# Patient Record
Sex: FEMALE | Race: WHITE
[De-identification: names, ages, dates, MRNs, and addresses within clinical notes are randomized per-mention and may not be internally consistent; named-entity substitution may affect disease eponyms.]

---

## 2017-08-11 ENCOUNTER — RESULT REVIEW (OUTPATIENT)
Age: 22
End: 2017-08-11

## 2017-10-24 ENCOUNTER — RESULT REVIEW (OUTPATIENT)
Age: 22
End: 2017-10-24

## 2018-11-06 ENCOUNTER — RESULT REVIEW (OUTPATIENT)
Age: 23
End: 2018-11-06

## 2018-12-21 ENCOUNTER — APPOINTMENT (OUTPATIENT)
Dept: NEUROLOGY | Facility: CLINIC | Age: 23
End: 2018-12-21
Payer: OTHER GOVERNMENT

## 2018-12-21 VITALS
DIASTOLIC BLOOD PRESSURE: 76 MMHG | SYSTOLIC BLOOD PRESSURE: 119 MMHG | BODY MASS INDEX: 28.25 KG/M2 | WEIGHT: 180 LBS | HEART RATE: 77 BPM | HEIGHT: 67 IN

## 2018-12-21 DIAGNOSIS — G40.909 EPILEPSY, UNSPECIFIED, NOT INTRACTABLE, W/OUT STATUS EPILEPTICUS: ICD-10-CM

## 2018-12-21 PROCEDURE — 99205 OFFICE O/P NEW HI 60 MIN: CPT

## 2018-12-21 RX ORDER — SUMATRIPTAN 50 MG/1
50 TABLET, FILM COATED ORAL
Qty: 6 | Refills: 1 | Status: ACTIVE | COMMUNITY
Start: 2018-12-21 | End: 1900-01-01

## 2018-12-27 ENCOUNTER — OTHER (OUTPATIENT)
Age: 23
End: 2018-12-27

## 2019-01-25 ENCOUNTER — APPOINTMENT (OUTPATIENT)
Dept: NEUROLOGY | Facility: CLINIC | Age: 24
End: 2019-01-25

## 2019-01-28 ENCOUNTER — MEDICATION RENEWAL (OUTPATIENT)
Age: 24
End: 2019-01-28

## 2019-02-01 ENCOUNTER — APPOINTMENT (OUTPATIENT)
Dept: NEUROLOGY | Facility: CLINIC | Age: 24
End: 2019-02-01
Payer: OTHER GOVERNMENT

## 2019-02-01 VITALS
HEIGHT: 67 IN | WEIGHT: 175 LBS | SYSTOLIC BLOOD PRESSURE: 139 MMHG | HEART RATE: 79 BPM | BODY MASS INDEX: 27.47 KG/M2 | DIASTOLIC BLOOD PRESSURE: 89 MMHG

## 2019-02-01 DIAGNOSIS — G43.109 MIGRAINE WITH AURA, NOT INTRACTABLE, W/OUT STATUS MIGRAINOSUS: ICD-10-CM

## 2019-02-01 DIAGNOSIS — R51 HEADACHE: ICD-10-CM

## 2019-02-01 PROCEDURE — 99214 OFFICE O/P EST MOD 30 MIN: CPT

## 2019-02-01 RX ORDER — ETHINYL ESTRADIOL AND LEVONORGESTREL 150-30(84)
0.15-0.03 &0.01 KIT ORAL
Refills: 0 | Status: ACTIVE | COMMUNITY

## 2019-02-01 RX ORDER — TOPIRAMATE 25 MG/1
25 TABLET, FILM COATED ORAL TWICE DAILY
Qty: 120 | Refills: 5 | Status: DISCONTINUED | COMMUNITY
Start: 2019-01-28 | End: 2019-02-01

## 2019-02-01 NOTE — ASSESSMENT
[FreeTextEntry1] : Ms. BELL presents with c/o frequent headache with migrainous quality. She also has focal complaint of left vision changes intermittently unrelated to severe HA, but associated with milder symptoms of left retroorbital pressure which may also be migrainous in quality. Change in headache symptoms and presence of persistent focality in migraine aura should be investigated with MRI.  Ms. BELL has h/o seizure, though unlikely, should also be investigated. In the interval, episodic HA have improved on topiramate but she is still experiencing moderate chronic daily headache. \par \par I suggested the following:\par \par 1. increase topiramate 75 q12. Ms. BELL was counselled on side-effects including weight loss, paresthesia, and lower efficacy of OCD and need to use barrier method.  and counselled on risk of nephrolithiasis, and need to maintain adequate hydration.\par 2. Sumatriptan 50mg prn with severe HA, may repeat after 30 min.  Pt counselled on side-effects of flushing, throat itching.\par 3. MRI brain - no zahra - study done - but need to get results from Stand-up MRI\par 4. amb EEG - 48h - to be done next week.\par 5. RTC 6 wks\par \par Greater than 50% of the encounter time was spent on counseling and coordination of care for reviewing records in [employer], discussion with patient regarding plan. I have spent 60 minutes of face to face time with the patient reviewing the cause of visual symptoms, migraines or seizure-like events, assessing the risk of recurrence, educating the patient and discussing possible treatment options and possible side effects of migraine medications. I also discussed seizure safety, and ways of reducing migraine frequency.\par

## 2019-02-01 NOTE — PHYSICAL EXAM
[FreeTextEntry1] : alert and oriented x 3, speech fluent, names easily, follows requests, good recall for recent and remote events.\par EOM fundi sharp bilaterally, EOM full without sustained nystagmus, PERRL, face symmetrical, no dysarthria\par Motor - full strength in all extremities. normal rapid-alternating movements.\par Sensory - intact LT bilaterally\par Coord - no tremor, ataxia\par Gait - stands without difficulty, normal gait.

## 2019-02-08 ENCOUNTER — APPOINTMENT (OUTPATIENT)
Dept: NEUROLOGY | Facility: CLINIC | Age: 24
End: 2019-02-08

## 2019-03-08 ENCOUNTER — APPOINTMENT (OUTPATIENT)
Dept: NEUROLOGY | Facility: CLINIC | Age: 24
End: 2019-03-08

## 2019-03-18 ENCOUNTER — APPOINTMENT (OUTPATIENT)
Dept: NEUROLOGY | Facility: CLINIC | Age: 24
End: 2019-03-18

## 2019-03-22 ENCOUNTER — TRANSCRIPTION ENCOUNTER (OUTPATIENT)
Age: 24
End: 2019-03-22

## 2019-04-04 ENCOUNTER — TRANSCRIPTION ENCOUNTER (OUTPATIENT)
Age: 24
End: 2019-04-04

## 2019-08-30 ENCOUNTER — RX RENEWAL (OUTPATIENT)
Age: 24
End: 2019-08-30

## 2019-08-30 RX ORDER — TOPIRAMATE 25 MG/1
25 TABLET, FILM COATED ORAL TWICE DAILY
Qty: 540 | Refills: 3 | Status: ACTIVE | COMMUNITY
Start: 2018-12-21 | End: 1900-01-01